# Patient Record
Sex: FEMALE | Race: BLACK OR AFRICAN AMERICAN | NOT HISPANIC OR LATINO | Employment: UNEMPLOYED | URBAN - METROPOLITAN AREA
[De-identification: names, ages, dates, MRNs, and addresses within clinical notes are randomized per-mention and may not be internally consistent; named-entity substitution may affect disease eponyms.]

---

## 2020-01-20 LAB
ABO + RH BLD: NORMAL
ABO + RH BLD: NORMAL
BLD GP AB SCN SERPL QL: NORMAL
CULTURE MICRO: NORMAL
HBV SURFACE AG SERPL QL IA: NORMAL
HIV 1+2 AB+HIV1 P24 AG SERPL QL IA: NON REACTIVE
RUBELLA ABY IGG: NORMAL
TREPONEMA ANTIBODIES: NON REACTIVE

## 2020-05-29 ENCOUNTER — HOSPITAL ENCOUNTER (OUTPATIENT)
Facility: CLINIC | Age: 30
End: 2020-05-29
Admitting: SPECIALIST
Payer: COMMERCIAL

## 2020-05-29 ENCOUNTER — HOSPITAL ENCOUNTER (OUTPATIENT)
Facility: CLINIC | Age: 30
Discharge: HOME | End: 2020-05-29
Attending: SPECIALIST | Admitting: SPECIALIST
Payer: COMMERCIAL

## 2020-05-29 VITALS
WEIGHT: 190 LBS | SYSTOLIC BLOOD PRESSURE: 103 MMHG | HEIGHT: 66 IN | TEMPERATURE: 98.1 F | RESPIRATION RATE: 16 BRPM | DIASTOLIC BLOOD PRESSURE: 60 MMHG | BODY MASS INDEX: 30.53 KG/M2 | HEART RATE: 80 BPM

## 2020-05-29 LAB — RUPTURE OF FETAL MEMBRANES BY ROM PLUS: NEGATIVE

## 2020-05-29 PROCEDURE — G0463 HOSPITAL OUTPT CLINIC VISIT: HCPCS | Mod: 25

## 2020-05-29 PROCEDURE — 59025 FETAL NON-STRESS TEST: CPT

## 2020-05-29 PROCEDURE — 84112 EVAL AMNIOTIC FLUID PROTEIN: CPT | Performed by: SPECIALIST

## 2020-05-29 RX ORDER — PRENATAL VIT/IRON FUM/FOLIC AC 27MG-0.8MG
1 TABLET ORAL DAILY
COMMUNITY

## 2020-05-29 ASSESSMENT — MIFFLIN-ST. JEOR: SCORE: 1588.58

## 2020-05-29 NOTE — PLAN OF CARE
ROM plus negative, FHTs reactive, occasional contraction pt not feeling, abdomen palpates soft, baby active. Pt is a scheduled repeat  section on  at [facility]. Dr Melendez updated on pt and fetal status by RENO Mariano. Pt may discharge to home per Dr Melendez. Discharge instructions reviewed with pt and her sister. All questions answered, pt agrees with plan to go home and will follow up with Dr Morales at her next appointment or sooner if she has any concerns. Pt left ambulatory at 1715.

## 2020-05-29 NOTE — PLAN OF CARE
Pt reports possible rupture of membranes 5/27 0930 when she got out of bed a small amount of clear fluid or discharge came out but nothing more. The following day she spoke to her OB and was told to call if it happens again. Then today at 1300 she experience the same feeling and reports a 1=2 inch wet spot on her dress. Denies vaginal bleeding. Reports baby is active.

## 2020-05-29 NOTE — DISCHARGE INSTRUCTIONS
Discharge Instruction for Undelivered Patients      You were seen for: Membrane Assessment  We Consulted:Al DEUTSCH  You had (Test or Medicine): Amne pro plus, monitoring, nst    Diet:   Drink 8 to 12 glasses of liquids (milk, juice, water) every day.  You may eat meals and snacks.     Activity:  Call your doctor or nurse midwife if your baby is moving less than usual.     Call your provider if you notice:  Swelling in your face or increased swelling in your hands or legs.  Headaches that are not relieved by Tylenol (acetaminophen).  Changes in your vision (blurring: seeing spots or stars.)  Nausea (sick to your stomach) and vomiting (throwing up).   Weight gain of 5 pounds or more per week.  Heartburn that doesn't go away.  Signs of bladder infection: pain when you urinate (use the toilet), need to go more often and more urgently.  The bag of jacobs (rupture of membranes) breaks, or you notice leaking in your underwear.  Bright red blood in your underwear.  Abdominal (lower belly) or stomach pain.  For first baby: Contractions (tightening) less than 5 minutes apart for one hour or more.  Second (plus) baby: Contractions (tightening) less than 10 minutes apart and getting stronger.  *If less than 34 weeks: Contractions (tightenings) more than 6 times in one hour.  Increase or change in vaginal discharge (note the color and amount)  Other:     Follow-up:  As scheduled in the clinic

## 2023-04-15 ENCOUNTER — NURSE TRIAGE (OUTPATIENT)
Dept: NURSING | Facility: CLINIC | Age: 33
End: 2023-04-15
Payer: COMMERCIAL

## 2023-04-15 NOTE — TELEPHONE ENCOUNTER
"Nurse Triage SBAR    Is this a 2nd Level Triage? NO    Situation: Patient calling with lymph node swollen in neck.  Consent: not needed    Background: Pt states 2 nights ago she found a small lump on R side of throat.  States it is getting bigger and she can't swallow.      Assessment:   Pt sttes the lump has gotten worse over the past 2 days.   Bump is size of a lollipop.    Pain is a 6/10  Pain radiates into ear.   Pt is able to drink - but cannot chew food and swallow it.    Pt states she had a few nose bleeds last night.   Pt has a headache - also rating 6/10   DENIES fever.    DENIES trouble with breathing.   Pt is 35 weeks pregnant.    Pt states she goes to [facility]         Protocol Recommended Disposition:   Go to  - Be seen within 24 hours.      Recommendation: Advised patient to Go to urgent care . Reviewed concerning symptoms and when to call back.   Pt states she will find someone to take her and will go to [facility] or [place].        Hoa Chu RN [facility] 4/15/2023 2:25 PM      Additional Information    Negative: Shock suspected (e.g., cold/pale/clammy skin, too weak to stand, low BP, rapid pulse)    Negative: Difficult to awaken or acting confused (e.g., disoriented, slurred speech)    Negative: [1] Similar pain previously AND [2] it was from \"heart attack\"    Negative: [1] Similar pain previously AND [2] it was from \"angina\" AND [3] not relieved by nitroglycerin    Negative: Sounds like a life-threatening emergency to the triager    Negative: Followed a neck injury (e.g., MVA, sports, impact or collision)    Negative: Chest pain    Negative: Lymph node in the neck is swollen or painful to the touch    Negative: Sore throat is main symptom    Negative: Difficulty breathing or unusual sweating (e.g., sweating without exertion)    Negative: [1] Stiff neck (can't put chin to chest) AND [2] headache    Negative: [1] Stiff neck (can't put chin to chest) AND [2] fever    " "Negative: Weakness of an arm or hand    Negative: Problems with bowel or bladder control    Negative: Head is twisting to one side (or ask \"is it turning against your will?\")    Negative: Patient sounds very sick or weak to the triager    Negative: [1] SEVERE neck pain (e.g., excruciating, unable to do any normal activities) AND [2] not improved after 2 hours of pain medicine    Negative: [1] Fever > 100.0 F (37.8 C) AND [2] Intravenous Drug Use (IVDU)    Negative: [1] Fever > 100.0 F (37.8 C) AND [2] diabetes mellitus or weak immune system (e.g., HIV positive, cancer chemo, splenectomy, organ transplant, chronic steroids)    Negative: Severe difficulty breathing (e.g., struggling for each breath, speaks in single words)    Negative: Known hernia is main concern (i.e., soft lump or swelling in the groin that goes away when you push on it)    Negative: Swelling of scrotum is main symptom    Negative: Swelling of face is main symptom    Negative: [1] Swollen node is in the neck AND [2] < 1 inch (2.5 cm) in size AND [3] sore throat is main symptom    Negative: [1] Node is in the neck AND [2] causes difficulty breathing    Negative: [1] Node is in the neck AND [2] can't swallow fluids    Negative: Fever > 103 F (39.4 C)    Negative: [1] Lump or swelling in groin AND [2] pulsating (like heartbeat)    Negative: Patient sounds very sick or weak to the triager    [1] Single large node AND [2] size > 1 inch (2.5 cm) AND [3] no fever    Protocols used: NECK PAIN OR [id number], LYMPH NODES [id number]      "

## 2023-04-16 ENCOUNTER — APPOINTMENT (OUTPATIENT)
Dept: ULTRASOUND IMAGING | Facility: CLINIC | Age: 33
End: 2023-04-16
Attending: EMERGENCY MEDICINE
Payer: COMMERCIAL

## 2023-04-16 ENCOUNTER — HOSPITAL ENCOUNTER (EMERGENCY)
Facility: CLINIC | Age: 33
Discharge: HOME | End: 2023-04-16
Attending: EMERGENCY MEDICINE | Admitting: EMERGENCY MEDICINE
Payer: COMMERCIAL

## 2023-04-16 ENCOUNTER — HOSPITAL ENCOUNTER (OUTPATIENT)
Facility: CLINIC | Age: 33
Discharge: HOME | End: 2023-04-16
Attending: OBSTETRICS & GYNECOLOGY | Admitting: OBSTETRICS & GYNECOLOGY
Payer: COMMERCIAL

## 2023-04-16 ENCOUNTER — HOSPITAL ENCOUNTER (OUTPATIENT)
Facility: CLINIC | Age: 33
End: 2023-04-16
Admitting: OBSTETRICS & GYNECOLOGY
Payer: COMMERCIAL

## 2023-04-16 ENCOUNTER — APPOINTMENT (OUTPATIENT)
Dept: ULTRASOUND IMAGING | Facility: CLINIC | Age: 33
End: 2023-04-16
Attending: OBSTETRICS & GYNECOLOGY
Payer: COMMERCIAL

## 2023-04-16 VITALS
HEART RATE: 79 BPM | TEMPERATURE: 97.9 F | DIASTOLIC BLOOD PRESSURE: 57 MMHG | SYSTOLIC BLOOD PRESSURE: 102 MMHG | RESPIRATION RATE: 16 BRPM | OXYGEN SATURATION: 98 %

## 2023-04-16 VITALS
RESPIRATION RATE: 20 BRPM | OXYGEN SATURATION: 97 % | SYSTOLIC BLOOD PRESSURE: 119 MMHG | DIASTOLIC BLOOD PRESSURE: 72 MMHG | TEMPERATURE: 98.9 F

## 2023-04-16 DIAGNOSIS — E04.1 THYROID NODULE: ICD-10-CM

## 2023-04-16 PROBLEM — Z36.89 ENCOUNTER FOR TRIAGE IN PREGNANT PATIENT: Status: ACTIVE | Noted: 2023-04-16

## 2023-04-16 PROCEDURE — G0463 HOSPITAL OUTPT CLINIC VISIT: HCPCS | Mod: 25

## 2023-04-16 PROCEDURE — 250N000011 HC RX IP 250 OP 636: Performed by: OBSTETRICS & GYNECOLOGY

## 2023-04-16 PROCEDURE — 76536 US EXAM OF HEAD AND NECK: CPT

## 2023-04-16 PROCEDURE — 250N000013 HC RX MED GY IP 250 OP 250 PS 637: Performed by: OBSTETRICS & GYNECOLOGY

## 2023-04-16 PROCEDURE — 99284 EMERGENCY DEPT VISIT MOD MDM: CPT | Mod: 25

## 2023-04-16 PROCEDURE — 76819 FETAL BIOPHYS PROFIL W/O NST: CPT

## 2023-04-16 PROCEDURE — 96360 HYDRATION IV INFUSION INIT: CPT

## 2023-04-16 RX ORDER — DEXTROSE, SODIUM CHLORIDE, SODIUM LACTATE, POTASSIUM CHLORIDE, AND CALCIUM CHLORIDE 5; .6; .31; .03; .02 G/100ML; G/100ML; G/100ML; G/100ML; G/100ML
INJECTION, SOLUTION INTRAVENOUS CONTINUOUS
Status: DISCONTINUED | OUTPATIENT
Start: 2023-04-16 | End: 2023-04-16 | Stop reason: HOSPADM

## 2023-04-16 RX ORDER — LIDOCAINE 40 MG/G
CREAM TOPICAL
Status: DISCONTINUED | OUTPATIENT
Start: 2023-04-16 | End: 2023-04-16 | Stop reason: HOSPADM

## 2023-04-16 RX ORDER — ACETAMINOPHEN 325 MG/1
975 TABLET ORAL EVERY 4 HOURS PRN
Status: DISCONTINUED | OUTPATIENT
Start: 2023-04-16 | End: 2023-04-16 | Stop reason: HOSPADM

## 2023-04-16 RX ADMIN — ACETAMINOPHEN 975 MG: 325 TABLET ORAL at 01:28

## 2023-04-16 RX ADMIN — SODIUM CHLORIDE, SODIUM LACTATE, POTASSIUM CHLORIDE, CALCIUM CHLORIDE AND DEXTROSE MONOHYDRATE: 5; 600; 310; 30; 20 INJECTION, SOLUTION INTRAVENOUS at 04:42

## 2023-04-16 ASSESSMENT — ACTIVITIES OF DAILY LIVING (ADL)
ADLS_ACUITY_SCORE: 35
ADLS_ACUITY_SCORE: 33

## 2023-04-16 ASSESSMENT — ENCOUNTER SYMPTOMS
FEVER: 0
VOMITING: 0
SHORTNESS OF BREATH: 0

## 2023-04-16 NOTE — ED PROVIDER NOTES
History     Chief Complaint:  neck swelling       HPI   Esther Claire is a 33 year old female who presents with neck swelling.  Patient is  at 35 weeks.  She was seen in labor and delivery this morning for transient abdominal pain, low back pain and decreased fetal movement.  Obstetric work-up was unremarkable.  She was sent back to the emergency department because she reports right neck swelling that began 2 to 3 days prior to arrival.  States that it hurts to swallow but is still able to eat and drink.  The area has gotten larger.  She has no history of similar swelling.  She denies any URI symptoms clinic cough, sore throat, fevers, chills or any other concerns..      Independent Historian:  None     Review of External Notes: Reviewed OB/GYN note from Dr. Ange Leal this morning 2023 regarding her lower abdominal pain and reassuring obstetric work-up    ROS:  Review of Systems   Constitutional: Negative for fever.   Respiratory: Negative for shortness of breath.    Cardiovascular: Negative for chest pain.   Gastrointestinal: Negative for vomiting.   All other systems reviewed and are negative.      Allergies:  No Known Allergies     Medications:    Prenatal Vit-Fe Fumarate-FA (PRENATAL MULTIVITAMIN W/IRON) 27-0.8 MG tablet      Past Medical History:    None    Past Surgical History:    Past Surgical History:   Procedure Laterality Date     GYN SURGERY       sections ,       Social History:  Presents to the ED with female family member      Physical Exam     Patient Vitals for the past 24 hrs:   BP Temp Temp src Pulse Resp SpO2   23 0845 102/57 -- -- 79 -- 98 %   23 0637 126/66 97.9  F (36.6  C) Temporal 82 16 98 %        Physical Exam      HEENT:    Tympanic membranes are clear bilateral.      Oropharynx is moist.       No tonsillar erythema without exudate or asymmetric edema.     No deviation of the uvula.     No pooling of secretions, trismus or sublingual  edema.  Eyes:    Conjunctiva normal, PERRL  Neck:    Supple, no meningismus.       No pain with manipulation of the hyoid.      Vague right paratracheal fullness with mild tenderness      No warmth, erythema, induration  CV:     Regular rate and rhythm     No murmurs, rubs or gallops.    PULM:    Clear to auscultation bilateral.       No respiratory distress.       No stridor.  ABD:    Soft, non-tender, gravid     No rebound or guarding.     No splenomegaly.  MSK:     No gross deformity to all four extremities.   LYMPH:   No cervical lymphadenopathy.  NEURO:   Alert.  Normal muscular tone, no atrophy.  Skin:    Warm, dry and intact.    PSYCH:    Mood is good and affect is appropriate.      Emergency Department Course     Imaging:  US Thyroid   Final Result   IMPRESSION:   Two thyroid nodules are described in detail above. By TI-RADS criteria, no fine-needle aspiration would be recommended.         Nodules are characterized per   ACR Thyroid Imaging, Reporting and Data System (TI-RADS): White Paper of the ACR TI-RADS Committee   Osmar Hernandez et al. Journal of the American College of Radiology 2017. Volume 14 (2017), Issue 5, 587-832.            Report per radiology        Emergency Department Course & Assessments:    Interventions:  Medications - No data to display     Independent Interpretation (X-rays, CTs, rhythm strip):  None    Social Determinants of Health affecting care:  None    Disposition:  The patient was discharged to home.     Impression & Plan        Medical Decision Makin-year-old pregnant female female presents with atraumatic neck swelling.  Swelling was isolated to the right paratracheal space.  Ultrasound was performed revealing 2 thyroid nodules as a source of this area of swelling.  Radiology does not recommend fine-needle aspiration.  I will have patient follow-up with primary care physician for further surveillance/evaluation.  Tylenol as needed for pain.    Diagnosis:     ICD-10-CM    1. Thyroid nodule  E04.1            Discharge Medications:  Discharge Medication List as of 4/16/2023  8:58 AM             4/16/2023   Russell Napier MD Matthews, Jeremiah R, MD  04/16/23 1151

## 2023-04-16 NOTE — ED TRIAGE NOTES
Pt reports lump on her neck that started Friday night that has gotten bigger. Pt is 35 weeks pregnant and came in earlier in the evening for abd pain and back pain. Pt was seen in L&D.

## 2023-04-16 NOTE — PROVIDER NOTIFICATION
04/16/23 0602   Provider Notification   Provider Name/Title MD Islas   Method of Notification Phone   Notification Reason Status Update     Updated provider on patient status. BPP score was 8/8 (see results). Pt still viki irregularly, q 2-7 min, has been sleeping for the past hour and states she is no longer feeling them. When able to palpate, palpate mild.    TORB to discharge patient and bring her to the ED for neck mass evaluation. Pt agreeable with plan.

## 2023-04-16 NOTE — DISCHARGE INSTRUCTIONS
You need to follow-up with your primary care physician regarding the thyroid nodules that were noted today.  This may require further work-up including biopsy or aspiration.  Please follow-up with your primary care doctor regarding further work-up.

## 2023-04-16 NOTE — PLAN OF CARE
Data: Patient assessed in the [facility] for decreased fetal movement, abdominal pain.  Cervical exam not examined.  Membranes intact.  Contractions/uterine assessment irregularly viki q 2-7 min, not feeling them.  Action:  Presumed adequate fetal oxygenation documented (see flow record). Discharge instructions reviewed.  Patient instructed to report change in fetal movement, vaginal leaking of fluid or bleeding, abdominal pain, or any concerns related to the pregnancy to her nurse/physician.    Response: Orders to discharge home per Ange Islas.  Patient verbalized understanding of education and verbalized agreement with plan. Discharged to ED for further evaluation at 0622- IV left in, saline locked.

## 2023-04-16 NOTE — PLAN OF CARE
Data: Patient presented to [facility]: 2023 12:24 AM.  Reason for maternal/fetal assessment is decreased fetal movement, abdominal pain. Patient reports having back and abdominal pain that started yesterday morning.  Patient is a .  Prenatal record reviewed. Pregnancy  has been complicated by mild polyhydramnios.  Gestational Age Unknown. VSS. Fetal movement decreased since yesterday night. Patient denies leaking of vaginal fluid/rupture of membranes, vaginal bleeding, pelvic pressure, nausea, vomiting, visual disturbances, epigastric or URQ pain, significant edema. Support person is present.   Action: Verbal consent for EFM. Triage assessment completed. Bill of rights reviewed.  Response: Patient verbalized agreement with plan. Will contact Dr Ange Islas with update and for further orders.

## 2023-04-16 NOTE — DISCHARGE INSTRUCTIONS
Discharge Instruction for Undelivered Patients      You were seen for: Fetal Assessment and back/abdominal pain  We Consulted: MD Islas  You had (Test or Medicine): IV hydration, tylenol     Diet:   Drink 8 to 12 glasses of liquids (milk, juice, water) every day.  You may eat meals and snacks.     Activity:  Count fetal kicks everyday (see handout)  Call your doctor or nurse midwife if your baby is moving less than usual.     Call your provider if you notice:  Swelling in your face or increased swelling in your hands or legs.  Headaches that are not relieved by Tylenol (acetaminophen).  Changes in your vision (blurring: seeing spots or stars.)  Nausea (sick to your stomach) and vomiting (throwing up).   Weight gain of 5 pounds or more per week.  Heartburn that doesn't go away.  Signs of bladder infection: pain when you urinate (use the toilet), need to go more often and more urgently.  The bag of jacobs (rupture of membranes) breaks, or you notice leaking in your underwear.  Bright red blood in your underwear.  Abdominal (lower belly) or stomach pain.  For first baby: Contractions (tightening) less than 5 minutes apart for one hour or more.  Second (plus) baby: Contractions (tightening) less than 10 minutes apart and getting stronger.  *If less than 34 weeks: Contractions (tightening) more than 6 times in one hour.  Increase or change in vaginal discharge (note the color and amount)      Follow-up:  As scheduled in the clinic

## 2023-04-16 NOTE — PROVIDER NOTIFICATION
23 0045   Provider Notification   Provider Name/Title MD Islas   Method of Notification At Bedside   Request Evaluate in Person   Notification Reason Patient Arrived   MD at bedside for patient arrival. Pt is , 35w2d, with history of 3x c/s and mild polyhdraminos with this pregnancy that was diagnosed at 34w. Pt c/o intermittent abdominal and back pain that started yesterday morning, started while she was moving around at home. Pt states that she noticed decreased fetal movement yesterday night- 'not as active as baby usually is'. Pt also c/o of difficulty with swallowing since yesterday. MD felt pt's neck and felt a firm bump on the right side (see provider note).    VORB from provider to place order for PO tylenol for back/abd pain and utilize hot pack. Keep pt on continuous monitoring until reactive strip. If FHT remains not reassuring, give pt clear juice. Pt may need a BPP eval if FHT is not reactive. If reactive, pt can be discharged and sent to ED for swollen node eval.

## 2023-04-16 NOTE — PROVIDER NOTIFICATION
04/16/23 0315   Provider Notification   Provider Name/Title MD Islas   Method of Notification In Department   Notification Reason Status Update   Updated provider on pt status. FHT can't be determined to be reactive or non-reactive, difficult to monitor baby- RN was at bedside for 45min to 1hr with hand on monitor but broken tracing still present. Irregular contractions present on toco and patient feeling them as mild uterine cramping, 9-10x since she's been on the monitor.    VORB to order BPP and give IV bolus of D5LR. Will update provider as needed.

## 2023-04-16 NOTE — H&P
Admitted: 2023    CHIEF COMPLAINT:  Lower abdominal pain, shooting vaginal pain, back pain, and right neck pain, and neck mass.    HPI:  The patient is a 33-year-old  at 35+ weeks.  She primarily follows with Dr Morales at [facility].  She has had an uncomplicated pregnancy and has had 3 prior C-sections and is planning a repeat in this pregnancy.  She is known to be size greater than dates and on her recent ultrasound at 34 weeks, she was found to have polyhydramnios.  I will continue to follow with biophysical profiles.    The patient noted yesterday morning that she started having some midline low back pain that got worse with movement and would remit with rest.  She has not tried anything for this pain.  She notes if she is up and walking it is a 6/10 and with rest it goes down to a 2/10.  She also notes more preferentially on her mid to left side of her lower abdomen, a spot where she has more discomfort and she notices it was also worse with movement and shoots into what feels like the vaginal canal.  She also notes this is a 6/10 and remits to a 2/10 with rest.  Again, she has not tried any management for this.    At the same time, she feels as though since yesterday evening she has had reduction in fetal movement.  She ate dinner and after about 9 p.m., she had less movement than she has been feeling.  She did try drinking cold water and she got no increase in fetal movement at that point. With both of these findings, she has been quite concerned and decided to come to the hospital.    Also of note, a couple of days ago she started feeling a small bump on her right aspect of her neck and she notes that it hurts to swallow.  For that reason, she has not been eating much.  She notes this bump has gotten larger and more painful.  She has no sick contacts.  She has had no fevers or chills.  She also denies any vomiting, loose stools or issues with urination.    PAST MEDICAL HISTORY:  Significant  for 3 prior pregnancies.    PAST SURGICAL HISTORY:  Significant for  section x3.    CURRENT MEDICATIONS:  Include magnesium and nasal spray and prenatal vitamin.    ALLERGIES:  SHE HAS NO KNOWN DRUG ALLERGIES.    FAMILY HISTORY:  Noncontributory.    SOCIAL HISTORY:  She has no significant history of drug, alcohol or tobacco use.    OBJECTIVE:  VITAL SIGNS:  Are stable.  HEENT:  Eyes:  Sclerae are white, there are no changes.  Mucous membranes are moist.  There is no tenderness in the sinuses.  When the neck was palpated, there was approximately a 2.5 cm, firm, nonmobile mass in the mid right lateral neck that is tender to palpation.  It does not feel connected to the thyroid; however, it is not significantly mobile.  There is no mirror imaging on the left side.  ABDOMEN:  Gravid, large for dates, and nontender.  There is palpation along her incision line and her tenderness cannot be re-elicited with palpation.  BACK:  Shows her tenderness being directly over the sacroiliac joints and there is no flank or costovertebral angle tenderness.  EXTREMITIES:  Warm, dry.  No cords, erythema or edema.    ASSESSMENT:  A 33-year-old  at 35+ weeks with reduced fetal movement, back pain, lower abdominal pain, and right neck mass with throat pain.  1.  Reduced fetal movement: We will get a nonstress test (NST) and make sure she is reactive. If she is nonreactive, we will proceed and get a biophysical profile for reassurance.  If this is reassuring, she can follow up with her primary provider.  We will also offer some juice should we need to increase blood sugar.  2.  Low back pain:  This does seem to be more of a sacroiliac syndrome.  We offered to her a warm pack and will attempt Tylenol.  We also discussed possible physical therapy (PT) in pregnancy and offered resources for that as well.  We discussed some, also, stretching activities that she could do predelivery such as Pilates and yoga movements and she will  consider those.  3.  Lower abdominal pain: With the nature of her pain lasting only a few seconds at a time and shooting into the vagina, it sounds to be more round ligament pain.  We discussed with her the physiology and discussed with her warning signs. She will continue to monitor.  4.  Neck mass with tenderness:  This does appear to be concerning. If there is full resolution of her evaluation here at labor and delivery, we will then send her to the emergency department for further evaluation of her neck mass.    Ange Islas MD        D: 2023   T: 2023   MT: LADI    Name:     KVNG BASS  MRN:      [id number]        Account:     [id number]   :      1990           Admitted:    2023       Document: [id number]    cc:  Primary Care Physician